# Patient Record
Sex: MALE | Race: OTHER | NOT HISPANIC OR LATINO
[De-identification: names, ages, dates, MRNs, and addresses within clinical notes are randomized per-mention and may not be internally consistent; named-entity substitution may affect disease eponyms.]

---

## 2019-08-01 PROBLEM — Z00.00 ENCOUNTER FOR PREVENTIVE HEALTH EXAMINATION: Status: ACTIVE | Noted: 2019-08-01

## 2019-08-26 ENCOUNTER — APPOINTMENT (OUTPATIENT)
Dept: UROLOGY | Facility: CLINIC | Age: 64
End: 2019-08-26
Payer: MEDICAID

## 2019-08-26 ENCOUNTER — APPOINTMENT (OUTPATIENT)
Dept: NEUROLOGY | Facility: CLINIC | Age: 64
End: 2019-08-26
Payer: MEDICAID

## 2019-08-26 VITALS
TEMPERATURE: 98.5 F | HEIGHT: 64 IN | WEIGHT: 200 LBS | BODY MASS INDEX: 34.15 KG/M2 | SYSTOLIC BLOOD PRESSURE: 140 MMHG | DIASTOLIC BLOOD PRESSURE: 74 MMHG | HEART RATE: 79 BPM

## 2019-08-26 VITALS
DIASTOLIC BLOOD PRESSURE: 73 MMHG | SYSTOLIC BLOOD PRESSURE: 136 MMHG | HEART RATE: 71 BPM | HEIGHT: 64 IN | TEMPERATURE: 98.3 F | OXYGEN SATURATION: 96 % | BODY MASS INDEX: 33.97 KG/M2 | WEIGHT: 199 LBS

## 2019-08-26 DIAGNOSIS — K70.30 ALCOHOLIC CIRRHOSIS OF LIVER W/OUT ASCITES: ICD-10-CM

## 2019-08-26 DIAGNOSIS — F17.200 NICOTINE DEPENDENCE, UNSPECIFIED, UNCOMPLICATED: ICD-10-CM

## 2019-08-26 DIAGNOSIS — K76.9 LIVER DISEASE, UNSPECIFIED: ICD-10-CM

## 2019-08-26 DIAGNOSIS — R41.3 OTHER AMNESIA: ICD-10-CM

## 2019-08-26 DIAGNOSIS — Z86.39 PERSONAL HISTORY OF OTHER ENDOCRINE, NUTRITIONAL AND METABOLIC DISEASE: ICD-10-CM

## 2019-08-26 DIAGNOSIS — Z86.79 PERSONAL HISTORY OF OTHER DISEASES OF THE CIRCULATORY SYSTEM: ICD-10-CM

## 2019-08-26 DIAGNOSIS — G47.9 SLEEP DISORDER, UNSPECIFIED: ICD-10-CM

## 2019-08-26 DIAGNOSIS — F10.21 ALCOHOL DEPENDENCE, IN REMISSION: ICD-10-CM

## 2019-08-26 DIAGNOSIS — R56.9 UNSPECIFIED CONVULSIONS: ICD-10-CM

## 2019-08-26 DIAGNOSIS — Z87.09 PERSONAL HISTORY OF OTHER DISEASES OF THE RESPIRATORY SYSTEM: ICD-10-CM

## 2019-08-26 PROCEDURE — 99205 OFFICE O/P NEW HI 60 MIN: CPT

## 2019-08-26 PROCEDURE — 99204 OFFICE O/P NEW MOD 45 MIN: CPT

## 2019-08-26 RX ORDER — INSULIN GLARGINE 100 [IU]/ML
INJECTION, SOLUTION SUBCUTANEOUS
Refills: 0 | Status: ACTIVE | COMMUNITY

## 2019-08-26 RX ORDER — METFORMIN HYDROCHLORIDE 625 MG/1
TABLET ORAL
Refills: 0 | Status: ACTIVE | COMMUNITY

## 2019-08-26 RX ORDER — LISINOPRIL 30 MG/1
TABLET ORAL
Refills: 0 | Status: ACTIVE | COMMUNITY

## 2019-08-26 RX ORDER — CHLORTHALIDONE 50 MG/1
TABLET ORAL
Refills: 0 | Status: ACTIVE | COMMUNITY

## 2019-08-26 RX ORDER — AMLODIPINE BESYLATE 5 MG/1
TABLET ORAL
Refills: 0 | Status: ACTIVE | COMMUNITY

## 2019-08-26 NOTE — REVIEW OF SYSTEMS
[Feeling Poorly] : feeling poorly [Memory Lapses or Loss] : memory loss [Decr. Concentrating Ability] : decreased concentrating ability [Poor Coordination] : poor coordination [Sleep Disturbances] : sleep disturbances [Eyesight Problems] : eyesight problems [Difficulties in Speech] : speech difficulties [Leg Claudication] : intermittent leg claudication [Negative] : Heme/Lymph [de-identified] : insomnia

## 2019-08-26 NOTE — ASSESSMENT
[FreeTextEntry1] : REEG\par Home sleep study\par Neuropsychology x memory disorder \par Brain MRI w/out C\par RTC in 2m\par

## 2019-08-26 NOTE — DISCUSSION/SUMMARY
[FreeTextEntry1] : 65 y/o male with multiple medical problems including;\par \par ETOH related seizures\par Memory disorder. Mild-Moderate. MCI\par Gait Disorder. Multifactorial. \par

## 2019-08-26 NOTE — HISTORY OF PRESENT ILLNESS
[FreeTextEntry1] : Ref MD:\par Dr Richard Woods,\par [address]\[place] [address]\par \par First visit of this 64 y/ single [place] man from [facility] who has multiple medical problems. He was ref for a question of seizure management. \par \par PMHx\par HTN\par DM Type 1\par Trauma\par Rectal Ca\par Kidney Stones\par Chronic Hep C\par ETOH abuse in the past\par Tobacco abuse in past\par Obesity\par Asthma\par \par HPI\par The daughter reports that he had a seizure related to ETOH abuse in 2005. He was not tx w AED's.\par No seizures since then. No GTC sz or events suggestive of seizures.\par \par Memory problems and gait disorder\par The daughter who takes care of him reports problems with memory and gait. Forgets things often. \par Some falls\par Hand tremor is moderate.\par \par \par

## 2019-08-27 LAB
ANION GAP SERPL CALC-SCNC: 14 MMOL/L
APPEARANCE: CLEAR
BACTERIA UR CULT: NORMAL
BACTERIA: NEGATIVE
BILIRUBIN URINE: NEGATIVE
BLOOD URINE: NEGATIVE
BUN SERPL-MCNC: 30 MG/DL
CALCIUM SERPL-MCNC: 10.1 MG/DL
CHLORIDE SERPL-SCNC: 106 MMOL/L
CO2 SERPL-SCNC: 20 MMOL/L
COLOR: YELLOW
CREAT SERPL-MCNC: 1.41 MG/DL
GLUCOSE QUALITATIVE U: NEGATIVE
GLUCOSE SERPL-MCNC: 205 MG/DL
HYALINE CASTS: 5 /LPF
KETONES URINE: NEGATIVE
LEUKOCYTE ESTERASE URINE: ABNORMAL
MICROSCOPIC-UA: NORMAL
NITRITE URINE: NEGATIVE
PH URINE: 6
POTASSIUM SERPL-SCNC: 5.3 MMOL/L
PROTEIN URINE: ABNORMAL
RED BLOOD CELLS URINE: 2 /HPF
SODIUM SERPL-SCNC: 139 MMOL/L
SPECIFIC GRAVITY URINE: 1.02
SQUAMOUS EPITHELIAL CELLS: 2 /HPF
UROBILINOGEN URINE: ABNORMAL
WHITE BLOOD CELLS URINE: 32 /HPF

## 2019-08-27 NOTE — LETTER BODY
[Dear  ___] : Dear  [unfilled], [Courtesy Letter:] : I had the pleasure of seeing your patient, [unfilled], in my office today. [Please see my note below.] : Please see my note below. [Consult Closing:] : Thank you very much for allowing me to participate in the care of this patient.  If you have any questions, please do not hesitate to contact me. [Sincerely,] : Sincerely, [FreeTextEntry3] : Jovon Santamaria MD\par Urologic Oncology\par Department of Urology\par [facility]\par \par [employer] [facility] \par \par  [FreeTextEntry2] : Richard Woods, DO\par [address]\[place] [address]

## 2019-08-27 NOTE — ASSESSMENT
[FreeTextEntry1] : 65yo male with reported history of right kidney stone and chronic right flank pain\par Pain is positional, likely musculoskeletal \par Unclear of stone size or location\par Check U/A, BMP today\par CT stone protocol\par Reviewed return precautions: severe pain, nausea/vomiting, fever or chills\par F/u to review

## 2019-08-27 NOTE — HISTORY OF PRESENT ILLNESS
[FreeTextEntry1] : This is a 65yo male with a reported history of a right kidney stone complaining of chronic right flank pain. Pain has been worse recently. No available imaging. Patient and family member are unsure of stone size or location. No prior stone surgery. No fevers/chills/nausea. Pain appears to be positional, worse with raising right arm and lying down.  [Flank Pain] : flank pain [Right Flank] : right flank [8] : 8 [Aching] : aching [None] : There is no radiation [Gradual] : gradual [Intermittent] : intermittently [Moderate] : moderate in severity [Worsening] : worsening

## 2019-08-29 ENCOUNTER — RX RENEWAL (OUTPATIENT)
Age: 64
End: 2019-08-29

## 2019-08-31 ENCOUNTER — FORM ENCOUNTER (OUTPATIENT)
Age: 64
End: 2019-08-31

## 2019-09-01 ENCOUNTER — APPOINTMENT (OUTPATIENT)
Dept: MRI IMAGING | Facility: CLINIC | Age: 64
End: 2019-09-01

## 2019-09-01 ENCOUNTER — OUTPATIENT (OUTPATIENT)
Dept: OUTPATIENT SERVICES | Facility: HOSPITAL | Age: 64
LOS: 1 days | End: 2019-09-01

## 2019-09-01 PROCEDURE — 70551 MRI BRAIN STEM W/O DYE: CPT | Mod: 26

## 2019-09-03 ENCOUNTER — OTHER (OUTPATIENT)
Age: 64
End: 2019-09-03

## 2019-09-05 ENCOUNTER — OTHER (OUTPATIENT)
Age: 64
End: 2019-09-05

## 2019-09-06 ENCOUNTER — APPOINTMENT (OUTPATIENT)
Dept: VASCULAR SURGERY | Facility: CLINIC | Age: 64
End: 2019-09-06
Payer: MEDICAID

## 2019-09-06 DIAGNOSIS — M79.606 PAIN IN LEG, UNSPECIFIED: ICD-10-CM

## 2019-09-06 PROCEDURE — 93971 EXTREMITY STUDY: CPT

## 2019-09-06 PROCEDURE — 99203 OFFICE O/P NEW LOW 30 MIN: CPT

## 2019-09-08 ENCOUNTER — OUTPATIENT (OUTPATIENT)
Dept: OUTPATIENT SERVICES | Facility: HOSPITAL | Age: 64
LOS: 1 days | End: 2019-09-08
Payer: MEDICAID

## 2019-09-08 ENCOUNTER — APPOINTMENT (OUTPATIENT)
Dept: SLEEP CENTER | Facility: HOSPITAL | Age: 64
End: 2019-09-08

## 2019-09-08 DIAGNOSIS — G47.33 OBSTRUCTIVE SLEEP APNEA (ADULT) (PEDIATRIC): ICD-10-CM

## 2019-09-08 PROCEDURE — 95810 POLYSOM 6/> YRS 4/> PARAM: CPT

## 2019-09-08 PROCEDURE — 95810 POLYSOM 6/> YRS 4/> PARAM: CPT | Mod: 26

## 2019-09-11 PROBLEM — M79.606 LEG PAIN, DIFFUSE: Status: ACTIVE | Noted: 2019-09-11

## 2019-09-12 ENCOUNTER — APPOINTMENT (OUTPATIENT)
Dept: NEUROLOGY | Facility: CLINIC | Age: 64
End: 2019-09-12
Payer: MEDICAID

## 2019-09-12 DIAGNOSIS — R41.3 OTHER AMNESIA: ICD-10-CM

## 2019-09-12 DIAGNOSIS — R56.9 UNSPECIFIED CONVULSIONS: ICD-10-CM

## 2019-09-12 PROCEDURE — 95819 EEG AWAKE AND ASLEEP: CPT

## 2019-09-13 NOTE — PROCEDURE
[FreeTextEntry1] : RLE venous US: No DVT, mild chronic SVT noted in the posterior calf. No reflux, tributary varicose vein noted from the proximal thigh to the calf posterior and inferior.

## 2019-09-13 NOTE — HISTORY OF PRESENT ILLNESS
[FreeTextEntry1] : 63 y/o F smoker with a Hx of rectal CA, HLD, HTN, and T2DM, presents today for an evaluation due to  pain in his LEs. He states that  when he walks  he experiences pain R>L The pt is otherwise denies claudication, legs edema,  DVT. He admits to varicose veins of his RLE.\par Pt suffers from a  rectal CA and receives treatment at [facility].

## 2019-09-13 NOTE — ADDENDUM
[FreeTextEntry1] : I, Jonathan Roy, acted solely as a scribe for Dr. Lane Akins on this date. 09/06/2019

## 2019-09-13 NOTE — END OF VISIT
[FreeTextEntry3] : All medical record entries made by the Scribe were at my, Dr. Lane Akins, direction and personally dictated by me on 09/06/2019 I have reviewed the chart and agree that the record accurately reflects my personal performance of the history, physical exam, assessment and plan. I have also personally directed, reviewed and agreed with the chart.

## 2019-09-13 NOTE — PHYSICAL EXAM
[Normal Breath Sounds] : Normal breath sounds [Alert] : alert [Calm] : calm [2+] : left 2+ [1+] : left 1+ [JVD] : no jugular venous distention  [de-identified] : Well appearing, NAD [de-identified] : NCAT [de-identified] : supple [de-identified] : FROM

## 2019-09-23 ENCOUNTER — APPOINTMENT (OUTPATIENT)
Dept: UROLOGY | Facility: CLINIC | Age: 64
End: 2019-09-23

## 2019-09-23 VITALS — DIASTOLIC BLOOD PRESSURE: 78 MMHG | SYSTOLIC BLOOD PRESSURE: 158 MMHG | TEMPERATURE: 99.7 F | HEART RATE: 80 BPM

## 2019-09-23 DIAGNOSIS — R10.9 UNSPECIFIED ABDOMINAL PAIN: ICD-10-CM

## 2019-09-26 ENCOUNTER — OTHER (OUTPATIENT)
Age: 64
End: 2019-09-26

## 2019-10-16 ENCOUNTER — APPOINTMENT (OUTPATIENT)
Dept: UROLOGY | Facility: CLINIC | Age: 64
End: 2019-10-16

## 2019-11-04 ENCOUNTER — APPOINTMENT (OUTPATIENT)
Dept: NEUROLOGY | Facility: CLINIC | Age: 64
End: 2019-11-04

## 2019-12-31 ENCOUNTER — OTHER (OUTPATIENT)
Age: 64
End: 2019-12-31

## 2020-03-02 ENCOUNTER — APPOINTMENT (OUTPATIENT)
Dept: NEPHROLOGY | Facility: CLINIC | Age: 65
End: 2020-03-02
Payer: MEDICAID

## 2020-03-02 VITALS
SYSTOLIC BLOOD PRESSURE: 142 MMHG | HEIGHT: 64 IN | HEART RATE: 84 BPM | DIASTOLIC BLOOD PRESSURE: 80 MMHG | WEIGHT: 186 LBS | BODY MASS INDEX: 31.76 KG/M2

## 2020-03-02 DIAGNOSIS — N20.0 CALCULUS OF KIDNEY: ICD-10-CM

## 2020-03-02 PROCEDURE — 99204 OFFICE O/P NEW MOD 45 MIN: CPT

## 2020-03-02 NOTE — CONSULT LETTER
[Dear  ___] : Dear  [unfilled], [Consult Letter:] : I had the pleasure of evaluating your patient, [unfilled]. [Please see my note below.] : Please see my note below. [( Thank you for referring [unfilled] for consultation for _____ )] : Thank you for referring [unfilled] for consultation for [unfilled] [Consult Closing:] : Thank you very much for allowing me to participate in the care of this patient.  If you have any questions, please do not hesitate to contact me. [FreeTextEntry2] : Jovon Santamaria [FreeTextEntry3] : Sincerely, \par \par August Juarez MD, FACP  [DrHaroldo  ___] : Dr. DESOUZA

## 2020-03-02 NOTE — REVIEW OF SYSTEMS
[Recent Weight Loss (___ Lbs)] : recent [unfilled] ~Ulb weight loss [Negative] : Psychiatric [FreeTextEntry2] : deliberate 13 lb

## 2020-03-02 NOTE — HISTORY OF PRESENT ILLNESS
[Stage 3] : stage 3 [Primary] : primary hypertension [FreeTextEntry1] : 65 yo man with rt kidney stone, CKD3, T2DM, hyperkalemia/metab acidosis, hx rectal CA, referred by Dr Santamaria. BP meds : amlodipine    , lisinopril   , chlorthalidone    .   Labs: creat/BUN = 1.4/30, GFR 53 K 5.3, CO2 = 20. UA 1+ protein.  Went to [facility] ER 1 mo ago for epistaxis, was told kidneys were abnormal on labs. I have no results from there. Stopped smoking 2 wk ago. Deliberately lost 13 lbs by eating smarter. BP usually 125-130, never> 140. No NSAID's.  Flank pain gone, no hematuria.

## 2020-03-02 NOTE — PHYSICAL EXAM
[General Appearance - Alert] : alert [General Appearance - In No Acute Distress] : in no acute distress [Sclera] : the sclera and conjunctiva were normal [PERRL With Normal Accommodation] : pupils were equal in size, round, and reactive to light [Outer Ear] : the ears and nose were normal in appearance [Oropharynx] : the oropharynx was normal [Extraocular Movements] : extraocular movements were intact [Neck Appearance] : the appearance of the neck was normal [Neck Cervical Mass (___cm)] : no neck mass was observed [Thyroid Diffuse Enlargement] : the thyroid was not enlarged [Jugular Venous Distention Increased] : there was no jugular-venous distention [Thyroid Nodule] : there were no palpable thyroid nodules [Heart Rate And Rhythm] : heart rate was normal and rhythm regular [Auscultation Breath Sounds / Voice Sounds] : lungs were clear to auscultation bilaterally [Heart Sounds] : normal S1 and S2 [Heart Sounds Gallop] : no gallops [Heart Sounds Pericardial Friction Rub] : no pericardial rub [Full Pulse] : the pedal pulses are present [Bowel Sounds] : normal bowel sounds [Abdomen Soft] : soft [Abdomen Mass (___ Cm)] : no abdominal mass palpated [Abdomen Tenderness] : non-tender [No CVA Tenderness] : no ~M costovertebral angle tenderness [No Spinal Tenderness] : no spinal tenderness [Abnormal Walk] : normal gait [Nail Clubbing] : no clubbing  or cyanosis of the fingernails [Musculoskeletal - Swelling] : no joint swelling seen [Motor Tone] : muscle strength and tone were normal [Skin Color & Pigmentation] : normal skin color and pigmentation [Skin Turgor] : normal skin turgor [] : no rash [Sensation] : the sensory exam was normal to light touch and pinprick [Deep Tendon Reflexes (DTR)] : deep tendon reflexes were 2+ and symmetric [Impaired Insight] : insight and judgment were intact [No Focal Deficits] : no focal deficits [Oriented To Time, Place, And Person] : oriented to person, place, and time [Affect] : the affect was normal [FreeTextEntry1] : trace

## 2020-03-02 NOTE — ASSESSMENT
[FreeTextEntry1] : 65 yo man with CKD3, probably due to diab nephropathy - possibly stable, but not clear until I review [facility] ER labs from 1 mo ago, and will check BMP, phos, PTH, uric acid A1C now. Return 6 mo. No evidence of stone issues at present BP good, slightly high today because of first visit anxiety

## 2020-03-03 LAB
APPEARANCE: CLEAR
BACTERIA: NEGATIVE
BASOPHILS # BLD AUTO: 0.07 K/UL
BASOPHILS NFR BLD AUTO: 0.8 %
BILIRUBIN URINE: NEGATIVE
BLOOD URINE: NEGATIVE
CALCIUM SERPL-MCNC: 10.3 MG/DL
COLOR: NORMAL
CREAT SPEC-SCNC: 78 MG/DL
CREAT/PROT UR: 0.8 RATIO
EOSINOPHIL # BLD AUTO: 0.16 K/UL
EOSINOPHIL NFR BLD AUTO: 1.8 %
GLUCOSE QUALITATIVE U: ABNORMAL
HCT VFR BLD CALC: 38.5 %
HGB BLD-MCNC: 11.8 G/DL
HYALINE CASTS: 1 /LPF
IMM GRANULOCYTES NFR BLD AUTO: 0.5 %
KETONES URINE: NEGATIVE
LEUKOCYTE ESTERASE URINE: NEGATIVE
LYMPHOCYTES # BLD AUTO: 2.91 K/UL
LYMPHOCYTES NFR BLD AUTO: 33.1 %
MAN DIFF?: NORMAL
MCHC RBC-ENTMCNC: 28 PG
MCHC RBC-ENTMCNC: 30.6 GM/DL
MCV RBC AUTO: 91.2 FL
MICROSCOPIC-UA: NORMAL
MONOCYTES # BLD AUTO: 1.04 K/UL
MONOCYTES NFR BLD AUTO: 11.8 %
NEUTROPHILS # BLD AUTO: 4.58 K/UL
NEUTROPHILS NFR BLD AUTO: 52 %
NITRITE URINE: NEGATIVE
NT-PROBNP SERPL-MCNC: 167 PG/ML
PARATHYROID HORMONE INTACT: 21 PG/ML
PH URINE: 6.5
PHOSPHATE SERPL-MCNC: 4.4 MG/DL
PLATELET # BLD AUTO: 259 K/UL
PROT UR-MCNC: 64 MG/DL
PROTEIN URINE: ABNORMAL
RBC # BLD: 4.22 M/UL
RBC # FLD: 15.1 %
RED BLOOD CELLS URINE: 1 /HPF
SPECIFIC GRAVITY URINE: 1.02
SQUAMOUS EPITHELIAL CELLS: 0 /HPF
URATE SERPL-MCNC: 7.1 MG/DL
UROBILINOGEN URINE: NORMAL
WBC # FLD AUTO: 8.8 K/UL
WHITE BLOOD CELLS URINE: 1 /HPF

## 2020-04-20 ENCOUNTER — APPOINTMENT (OUTPATIENT)
Dept: NEPHROLOGY | Facility: CLINIC | Age: 65
End: 2020-04-20

## 2020-06-29 ENCOUNTER — APPOINTMENT (OUTPATIENT)
Dept: NEPHROLOGY | Facility: CLINIC | Age: 65
End: 2020-06-29

## 2020-10-09 ENCOUNTER — APPOINTMENT (OUTPATIENT)
Dept: ENDOCRINOLOGY | Facility: CLINIC | Age: 65
End: 2020-10-09
Payer: MEDICAID

## 2020-10-09 VITALS
HEART RATE: 80 BPM | BODY MASS INDEX: 31.93 KG/M2 | DIASTOLIC BLOOD PRESSURE: 75 MMHG | SYSTOLIC BLOOD PRESSURE: 151 MMHG | WEIGHT: 186 LBS

## 2020-10-09 DIAGNOSIS — E16.2 HYPOGLYCEMIA, UNSPECIFIED: ICD-10-CM

## 2020-10-09 LAB — GLUCOSE BLDC GLUCOMTR-MCNC: 214

## 2020-10-09 PROCEDURE — 99205 OFFICE O/P NEW HI 60 MIN: CPT | Mod: 25

## 2020-10-09 PROCEDURE — 82962 GLUCOSE BLOOD TEST: CPT

## 2020-10-13 PROBLEM — E16.2 HYPOGLYCEMIA: Status: ACTIVE | Noted: 2020-10-13

## 2020-10-13 NOTE — HISTORY OF PRESENT ILLNESS
[FreeTextEntry1] : 64 y/o M w/ Hx of DM2, HTN, HLD, CAD, and CKD stage 3\par here for initial evaluation and management\par generally feels well and endorses no acute complaints.\par reports DM2 diagnosisin 2005, has been on insulin since. presently compliant w/ bydureon 2 mg, metformin 500 mg BID lantus 40-50 units QHS and humalog 8 units TIDAC. reports frequent early AM hypoglycemia ~60 w/ symptoms. peristent post prandial hyperglycemia. he otherwise denies any f/c, CP, SOB, palpitations, tremors, depressed mood, anxiety, palpitations, n/v, stool/urinary abn, skin/weight changes, heat/cold intolerance, HAs, breast/nipple changes, polyuria/polydipsia/nocturia or other complaints.\par \par

## 2020-10-13 NOTE — ASSESSMENT
[Long Term Vascular Complications] : long term vascular complications of diabetes [Carbohydrate Consistent Diet] : carbohydrate consistent diet [Importance of Diet and Exercise] : importance of diet and exercise to improve glycemic control, achieve weight loss and improve cardiovascular health [Hypoglycemia Management] : hypoglycemia management [FreeTextEntry1] : 1) DM2: Uncontrolled, A1C  target of <7%. Natural hx of the disease and importance of treatment targets discussed at length, she verbalized understanding. ADA diet and importance of exercise discussed at length. Plan is to increase metformin in the future if renal function remains stable, cont GLP-1 agonist today. Reduce Lantus to 38 units QHS, increse bolus insulin to 12 units TIDAC titration instructions provided. Refer to Nutritionist today. We susi check microalbumin, lipids and labs on the NV. Discuss vaccines and podiatry/opthalmology referrals on NV explained the potential high risk and toxicities with chronic insulin use including, but not limited to life threatening hypoglycemia and death, Verbalized understanding and agrees with treatment plan, will contact MD and seek emergency medical care if condition changes.\par \par \par 2) Weight gain:  complicated by DM2. Discussed medical  strategies. Pt would like to try lifestyle modifications and GLP-1 agonist therapy at this time. Reassess on the NV for at least ~5% TBW loss.\par \par 3) Essential HTN: Pt is not at goal BP and on an ACE inh. Reassess microalbumin prior to the NV. consider SGLT-2 inh if renal function remains stable\par  \par 4) Dyslipidemia: Pt is not on a moderate intensity statin.  REassess lipids on the next visit. LDL target <100.\par

## 2020-12-10 ENCOUNTER — APPOINTMENT (OUTPATIENT)
Dept: ENDOCRINOLOGY | Facility: CLINIC | Age: 65
End: 2020-12-10

## 2021-03-17 ENCOUNTER — APPOINTMENT (OUTPATIENT)
Dept: PULMONOLOGY | Facility: CLINIC | Age: 66
End: 2021-03-17
Payer: MEDICAID

## 2021-03-17 VITALS
TEMPERATURE: 98.1 F | HEIGHT: 64 IN | WEIGHT: 185 LBS | SYSTOLIC BLOOD PRESSURE: 146 MMHG | BODY MASS INDEX: 31.58 KG/M2 | OXYGEN SATURATION: 98 % | HEART RATE: 77 BPM | DIASTOLIC BLOOD PRESSURE: 74 MMHG

## 2021-03-17 DIAGNOSIS — Z20.822 CONTACT WITH AND (SUSPECTED) EXPOSURE TO COVID-19: ICD-10-CM

## 2021-03-17 PROCEDURE — 99204 OFFICE O/P NEW MOD 45 MIN: CPT | Mod: GC

## 2021-03-18 PROBLEM — Z20.822 ENCOUNTER FOR SCREENING LABORATORY TESTING FOR COVID-19 VIRUS IN ASYMPTOMATIC PATIENT: Status: ACTIVE | Noted: 2021-03-18

## 2021-03-24 ENCOUNTER — OUTPATIENT (OUTPATIENT)
Dept: OUTPATIENT SERVICES | Facility: HOSPITAL | Age: 66
LOS: 1 days | End: 2021-03-24
Payer: MEDICAID

## 2021-03-24 ENCOUNTER — APPOINTMENT (OUTPATIENT)
Dept: CT IMAGING | Facility: HOSPITAL | Age: 66
End: 2021-03-24

## 2021-03-24 PROCEDURE — 71250 CT THORAX DX C-: CPT

## 2021-03-24 PROCEDURE — 71250 CT THORAX DX C-: CPT | Mod: 26

## 2021-03-29 NOTE — HISTORY OF PRESENT ILLNESS
[TextBox_4] : 03/17/2021: Asked to evaluate patient by Dr Woods for .COPD. Patient is a 66 yo man who is a life long smoker >45 pack years \par suffers from DM , CKD and neuropathy as well. He complains of SOB on exertion, can only walk 1/2 to 1 city block for 2-3 years now, denies any chest pain , has chronic mostly non productive cough. does wheeze. Never had a clot in life. Did lose about 10 lbs in last 1 years ,appetite is fine. No bony pain.Under went sleep study in 2019 which was normal.   Saw a cardiologist about 2 years ago for murmur, " everything was normal " per daughter, don’t remember the name of the Cardiologist. hx of alcohol abuse. N ohx of cancer in the family. He lives at home , his daughter is his health aid as well, she is in the room with him today helping giving collateral information. \par

## 2021-03-29 NOTE — CONSULT LETTER
[Dear  ___] : Dear  [unfilled], [( Thank you for referring [unfilled] for consultation for _____ )] : Thank you for referring [unfilled] for consultation for [unfilled] [Please see my note below.] : Please see my note below. [Consult Closing:] : Thank you very much for allowing me to participate in the care of this patient.  If you have any questions, please do not hesitate to contact me. [Sincerely,] : Sincerely, [FreeTextEntry2] : Richard Woods, DO\par [address] \[place] [address]\par  [FreeTextEntry3] : Bhavani Flores MD, FCCP\par

## 2021-03-29 NOTE — ASSESSMENT
[FreeTextEntry1] : Impression:\par Smoker\par HORNER\par Likely COPD\par \par Plan:\par Patient is a life long smoker, uncontrolled diabetic with alcohol abuse hx presenting with Chronic HORNER and dry cough. Patient is currently only on  albuterol PRN. There is also a hx of some weight loss. Concern for emphysema / COPD , would need PFT. We would also get CT chest to assess the extent of emphysema and see if there are any lung nodules. Patient is planning to get COVID vaccine. \par \par --\par Attending Addendum\par \par Seen and examined by me and agree w above. 66yo active smoker w markedly reduced exercise tolerance. We will get a CT chest and then bring him back w full PFT to diagnose COPD. Has remotely had cardiology evaluation and likely needs cardiology re-evaluation as well. On Covid vax waiting list.\par --\par CT chest [facility] 3/24/21 personally reviewed :\par 1. Mild emphysema.\par 2. Dense aortic root/valvular calcification. Severe coronary calcifications.\par 3. Cirrhosis with prominent splenorenal shunt.\par Has appt this week.

## 2021-03-29 NOTE — REVIEW OF SYSTEMS
[Recent Wt Loss (___ Lbs)] : ~T recent [unfilled] lb weight loss [Cough] : cough [Dyspnea] : dyspnea [Wheezing] : wheezing [SOB on Exertion] : sob on exertion [Diabetes] : diabetes [Fever] : no fever [Fatigue] : no fatigue [Chills] : no chills [Epistaxis] : no epistaxis [Eye Irritation] : no eye irritation [Nasal Congestion] : no nasal congestion [Postnasal Drip] : no postnasal drip [Hemoptysis] : no hemoptysis [Chest Tightness] : no chest tightness [Frequent URIs] : no frequent URIs [Sputum] : no sputum [Pleuritic Pain] : no pleuritic pain [Chest Discomfort] : no chest discomfort [Orthopnea] : no orthopnea [Syncope] : no syncope [Nasal Discharge] : no nasal discharge [GERD] : no gerd [Diarrhea] : no diarrhea [Food Intolerance] : no food intolerance [Frequency] : no dysuria [Arthralgias] : no arthralgias [Trauma/ Injury] : no trauma/ injury [Rash] : no rash [Anemia] : no anemia [Headache] : no headache [Seizures] : no seizures [Confusion] : no confusion [Anxiety] : no anxiety [Thyroid Problem] : no thyroid problem

## 2021-03-31 ENCOUNTER — APPOINTMENT (OUTPATIENT)
Dept: PULMONOLOGY | Facility: CLINIC | Age: 66
End: 2021-03-31

## 2021-04-15 ENCOUNTER — APPOINTMENT (OUTPATIENT)
Dept: PULMONOLOGY | Facility: CLINIC | Age: 66
End: 2021-04-15
Payer: MEDICAID

## 2021-04-15 VITALS
OXYGEN SATURATION: 98 % | SYSTOLIC BLOOD PRESSURE: 118 MMHG | HEIGHT: 64 IN | DIASTOLIC BLOOD PRESSURE: 65 MMHG | HEART RATE: 69 BPM | BODY MASS INDEX: 30.05 KG/M2 | WEIGHT: 176 LBS | TEMPERATURE: 98.7 F

## 2021-04-15 PROCEDURE — 99214 OFFICE O/P EST MOD 30 MIN: CPT

## 2021-04-15 NOTE — PHYSICAL EXAM
[No Resp Distress] : no resp distress [Clear to Auscultation Bilaterally] : clear to auscultation bilaterally

## 2021-04-20 LAB — SARS-COV-2 N GENE NPH QL NAA+PROBE: NOT DETECTED

## 2021-04-22 ENCOUNTER — APPOINTMENT (OUTPATIENT)
Dept: PULMONOLOGY | Facility: CLINIC | Age: 66
End: 2021-04-22
Payer: MEDICAID

## 2021-04-22 PROCEDURE — 94727 GAS DIL/WSHOT DETER LNG VOL: CPT

## 2021-04-22 PROCEDURE — 94060 EVALUATION OF WHEEZING: CPT

## 2021-04-22 PROCEDURE — 94729 DIFFUSING CAPACITY: CPT

## 2021-04-23 NOTE — HISTORY OF PRESENT ILLNESS
[TextBox_4] : 03/17/2021 [Benito]: Asked to evaluate patient by Dr Woods for COPD. Patient is a 64 yo man who is a life long smoker >45 pack years \par suffers from DM , CKD and neuropathy as well. He complains of SOB on exertion, can only walk 1/2 to 1 city block for 2-3 years now, denies any chest pain , has chronic mostly non productive cough. does wheeze. Never had a clot in life. Did lose about 10 lbs in last 1 years ,appetite is fine. No bony pain.Under went sleep study in 2019 which was normal.   Saw a cardiologist about 2 years ago for murmur, " everything was normal " per daughter, don’t remember the name of the Cardiologist. hx of alcohol abuse. N ohx of cancer in the family. He lives at home , his daughter is his health aid as well, she is in the room with him today helping giving collateral information. \par \par 4/15/21: He has a new primary at [facility] but the name is unknown. Had CT and comes in today for PFT but didn't know to get Covid swab. Feels the same in terms of dyspnea. Daughter notes that he is on Flovent and albuterol w no benefit though unclear if he can effectively use inhalers. Did get Covid vaccine w no side effects.\par

## 2021-04-23 NOTE — ASSESSMENT
[FreeTextEntry1] : Data reviewed:\par \par CT chest [facility] 3/24/21 personally reviewed :\par 1. Mild emphysema.\par 2. Dense aortic root/valvular calcification. Severe coronary calcifications.\par 3. Cirrhosis with prominent splenorenal shunt.\par \par PFT 4/15/21: did not have Covid test\par \par Impression:\par Dyspnea on exertion\par Minimal emphysema\par Tobacco dependence\par \par Plan:\par The emphysema is minimal and does not explain his dyspnea.\par He will hold Flovent, and hold albuterol day of testing, and return for full PFT to evaluate for COPD.\par Will refer to cardiology for evaluation for CAD/valvular disease.\par Will be eligible for LDCT 3/2022. No lesions of concern at this time.\par --\par PFT 4/22/21: entirely normal, no BD response / d/w daughter, he sees Dr Garsia 5/11

## 2021-05-11 ENCOUNTER — APPOINTMENT (OUTPATIENT)
Dept: HEART AND VASCULAR | Facility: CLINIC | Age: 66
End: 2021-05-11
Payer: MEDICAID

## 2021-05-11 ENCOUNTER — NON-APPOINTMENT (OUTPATIENT)
Age: 66
End: 2021-05-11

## 2021-05-11 VITALS
HEIGHT: 64 IN | HEART RATE: 84 BPM | TEMPERATURE: 98.5 F | SYSTOLIC BLOOD PRESSURE: 137 MMHG | BODY MASS INDEX: 29.02 KG/M2 | OXYGEN SATURATION: 100 % | DIASTOLIC BLOOD PRESSURE: 68 MMHG | WEIGHT: 170 LBS

## 2021-05-11 DIAGNOSIS — E11.65 TYPE 2 DIABETES MELLITUS WITH HYPERGLYCEMIA: ICD-10-CM

## 2021-05-11 DIAGNOSIS — E78.5 HYPERLIPIDEMIA, UNSPECIFIED: ICD-10-CM

## 2021-05-11 DIAGNOSIS — N18.30 CHRONIC KIDNEY DISEASE, STAGE 3 UNSPECIFIED: ICD-10-CM

## 2021-05-11 DIAGNOSIS — R06.02 SHORTNESS OF BREATH: ICD-10-CM

## 2021-05-11 DIAGNOSIS — E87.5 HYPERKALEMIA: ICD-10-CM

## 2021-05-11 DIAGNOSIS — I10 ESSENTIAL (PRIMARY) HYPERTENSION: ICD-10-CM

## 2021-05-11 DIAGNOSIS — R01.1 CARDIAC MURMUR, UNSPECIFIED: ICD-10-CM

## 2021-05-11 PROCEDURE — 93000 ELECTROCARDIOGRAM COMPLETE: CPT

## 2021-05-11 PROCEDURE — 99204 OFFICE O/P NEW MOD 45 MIN: CPT

## 2021-05-11 RX ORDER — EXENATIDE 2 MG/.65ML
INJECTION, SUSPENSION, EXTENDED RELEASE SUBCUTANEOUS
Refills: 0 | Status: ACTIVE | COMMUNITY

## 2021-05-11 RX ORDER — INSULIN LISPRO 100 [IU]/ML
100 INJECTION, SOLUTION INTRAVENOUS; SUBCUTANEOUS
Refills: 0 | Status: ACTIVE | COMMUNITY

## 2021-05-11 NOTE — ASSESSMENT
[FreeTextEntry1] : Murmurs- c/w severe AS and some degree of MR, will echo first prior to stress testing.  If severe AS would cath\par \par DM- A1c and Lipids drawn.  Labs were drawn today in Office. \par \par SOB- most likely due to valvular and coronary artery disease. Will start with echo to assess for severe AS prior to the stress test

## 2021-05-11 NOTE — PHYSICAL EXAM
[Normal] : alert and oriented, normal memory [de-identified] : No JVD or bruits  [de-identified] : 3/6 Leilani shaped systolic murmur, A2 reduced, 2/6 apical sys murmur.

## 2021-05-11 NOTE — REVIEW OF SYSTEMS
[Feeling Fatigued] : feeling fatigued [SOB] : shortness of breath [Dyspnea on exertion] : dyspnea during exertion [Negative] : Heme/Lymph

## 2021-05-11 NOTE — REASON FOR VISIT
[Symptom and Test Evaluation] : symptom and test evaluation [Coronary Artery Disease] : coronary artery disease [Family Member] : family member [FreeTextEntry1] : 64 y/o HM with severe HORNER.  CT shows severe coronary artery calcification.  There is also aortic root and MAC.  He did stop smoking and has the Covid vaccine.  He has previously been told of a murmur. There is no CP but he can not do 1 flight of stairs without stopping on the 4th step and can not walk 1 block without stopping. CRFs include an extensive smoking hs, DM, HLD, HTN and CKD.\par \par \par EKG: NSR, normal axis and intervals, no ST-Tw abnormalities. 5/11/21

## 2021-05-12 LAB
CHOLEST SERPL-MCNC: 102 MG/DL
ESTIMATED AVERAGE GLUCOSE: 177 MG/DL
HBA1C MFR BLD HPLC: 7.8 %
HDLC SERPL-MCNC: 28 MG/DL
LDLC SERPL CALC-MCNC: 39 MG/DL
NONHDLC SERPL-MCNC: 74 MG/DL
TRIGL SERPL-MCNC: 173 MG/DL

## 2021-05-25 ENCOUNTER — APPOINTMENT (OUTPATIENT)
Dept: HEART AND VASCULAR | Facility: CLINIC | Age: 66
End: 2021-05-25
Payer: MEDICAID

## 2021-05-25 PROCEDURE — 93306 TTE W/DOPPLER COMPLETE: CPT

## 2021-08-26 ENCOUNTER — APPOINTMENT (OUTPATIENT)
Dept: CT IMAGING | Facility: HOSPITAL | Age: 66
End: 2021-08-26

## 2021-08-26 ENCOUNTER — OUTPATIENT (OUTPATIENT)
Dept: OUTPATIENT SERVICES | Facility: HOSPITAL | Age: 66
LOS: 1 days | End: 2021-08-26
Payer: MEDICAID

## 2021-08-26 PROCEDURE — 75574 CT ANGIO HRT W/3D IMAGE: CPT | Mod: 26

## 2021-08-26 PROCEDURE — 75574 CT ANGIO HRT W/3D IMAGE: CPT
